# Patient Record
Sex: FEMALE | Race: OTHER | Employment: PART TIME | URBAN - METROPOLITAN AREA
[De-identification: names, ages, dates, MRNs, and addresses within clinical notes are randomized per-mention and may not be internally consistent; named-entity substitution may affect disease eponyms.]

---

## 2022-03-18 PROBLEM — D64.9 ANEMIA: Status: ACTIVE | Noted: 2018-07-01

## 2024-03-05 ENCOUNTER — APPOINTMENT (OUTPATIENT)
Dept: GENERAL RADIOLOGY | Age: 34
End: 2024-03-05

## 2024-03-05 ENCOUNTER — HOSPITAL ENCOUNTER (EMERGENCY)
Age: 34
Discharge: HOME | End: 2024-03-05

## 2024-03-05 VITALS
HEART RATE: 70 BPM | SYSTOLIC BLOOD PRESSURE: 110 MMHG | BODY MASS INDEX: 23.74 KG/M2 | TEMPERATURE: 98.6 F | HEIGHT: 63 IN | WEIGHT: 134 LBS | DIASTOLIC BLOOD PRESSURE: 67 MMHG | RESPIRATION RATE: 17 BRPM | OXYGEN SATURATION: 100 %

## 2024-03-05 DIAGNOSIS — S52.255A CLOSED NONDISPLACED COMMINUTED FRACTURE OF SHAFT OF LEFT ULNA, INITIAL ENCOUNTER: Primary | ICD-10-CM

## 2024-03-05 LAB — HCG UR QL: NEGATIVE

## 2024-03-05 PROCEDURE — 29125 APPL SHORT ARM SPLINT STATIC: CPT

## 2024-03-05 PROCEDURE — 73110 X-RAY EXAM OF WRIST: CPT

## 2024-03-05 PROCEDURE — 73090 X-RAY EXAM OF FOREARM: CPT

## 2024-03-05 PROCEDURE — 99283 EMERGENCY DEPT VISIT LOW MDM: CPT

## 2024-03-05 PROCEDURE — 6370000000 HC RX 637 (ALT 250 FOR IP)

## 2024-03-05 PROCEDURE — 81025 URINE PREGNANCY TEST: CPT

## 2024-03-05 RX ORDER — ONDANSETRON 4 MG/1
4 TABLET, ORALLY DISINTEGRATING ORAL 3 TIMES DAILY PRN
Qty: 21 TABLET | Refills: 0 | Status: SHIPPED | OUTPATIENT
Start: 2024-03-05

## 2024-03-05 RX ORDER — ACETAMINOPHEN 325 MG/1
650 TABLET ORAL
Status: COMPLETED | OUTPATIENT
Start: 2024-03-05 | End: 2024-03-05

## 2024-03-05 RX ORDER — HYDROCODONE BITARTRATE AND ACETAMINOPHEN 5; 325 MG/1; MG/1
1 TABLET ORAL EVERY 6 HOURS PRN
Qty: 10 TABLET | Refills: 0 | Status: SHIPPED | OUTPATIENT
Start: 2024-03-05 | End: 2024-03-08

## 2024-03-05 RX ORDER — IBUPROFEN 600 MG/1
600 TABLET ORAL
Status: COMPLETED | OUTPATIENT
Start: 2024-03-05 | End: 2024-03-05

## 2024-03-05 RX ADMIN — ACETAMINOPHEN 650 MG: 325 TABLET ORAL at 21:25

## 2024-03-05 RX ADMIN — IBUPROFEN 600 MG: 600 TABLET, FILM COATED ORAL at 21:25

## 2024-03-05 ASSESSMENT — PAIN DESCRIPTION - LOCATION
LOCATION: ARM
LOCATION: ARM

## 2024-03-05 ASSESSMENT — PAIN - FUNCTIONAL ASSESSMENT
PAIN_FUNCTIONAL_ASSESSMENT: 0-10
PAIN_FUNCTIONAL_ASSESSMENT: NONE - DENIES PAIN

## 2024-03-05 ASSESSMENT — LIFESTYLE VARIABLES
HOW MANY STANDARD DRINKS CONTAINING ALCOHOL DO YOU HAVE ON A TYPICAL DAY: PATIENT DOES NOT DRINK
HOW OFTEN DO YOU HAVE A DRINK CONTAINING ALCOHOL: NEVER

## 2024-03-05 ASSESSMENT — PAIN DESCRIPTION - ORIENTATION
ORIENTATION: LEFT
ORIENTATION: LEFT

## 2024-03-05 ASSESSMENT — PAIN SCALES - GENERAL
PAINLEVEL_OUTOF10: 9
PAINLEVEL_OUTOF10: 9

## 2024-03-06 NOTE — ED PROVIDER NOTES
lacerations, snuff box tenderness or crepitus. Normal pulse.      Left hand: Normal. No swelling, deformity, tenderness or bony tenderness. Normal range of motion. Normal strength. Normal sensation. Normal capillary refill. Normal pulse.        Arms:       Cervical back: Normal range of motion. No rigidity.   Lymphadenopathy:      Cervical: No cervical adenopathy.   Skin:     General: Skin is warm.      Findings: No rash.   Neurological:      Mental Status: She is alert and oriented to person, place, and time.      Sensory: No sensory deficit.      Motor: No weakness.   Psychiatric:         Behavior: Behavior normal.        Procedures     Procedures    Orders Placed This Encounter   Procedures    XR WRIST LEFT (MIN 3 VIEWS)    XR RADIUS ULNA LEFT (2 VIEWS)    [facility]    POC PREGNANCY UR-QUAL    POC Pregnancy Urine Qual    [facility], Left; M        Medications given during this emergency department visit:  Medications   acetaminophen (TYLENOL) tablet 650 mg (650 mg Oral Given 3/5/24 2125)   ibuprofen (ADVIL;MOTRIN) tablet 600 mg (600 mg Oral Given 3/5/24 2125)       Discharge Medication List as of 3/5/2024 10:45 PM        START taking these medications    Details   HYDROcodone-acetaminophen (NORCO) 5-325 MG per tablet Take 1 tablet by mouth every 6 hours as needed for Pain for up to 3 days. Intended supply: 3 days. Take lowest dose possible to manage pain Max Daily Amount: 4 tablets, Disp-10 tablet, R-0Print      ondansetron (ZOFRAN-ODT) 4 MG disintegrating tablet Take 1 tablet by mouth 3 times daily as needed for Nausea or Vomiting, Disp-21 tablet, R-0Print              No past medical history on file.     No past surgical history on file.     Social History     Socioeconomic History    Marital status:         Discharge Medication List as of 3/5/2024 10:45 PM           Results for orders placed or performed during the hospital encounter of

## 2024-03-06 NOTE — ED NOTES
I have reviewed discharge instructions with the patient.  The patient verbalized understanding.    Patient left ED via Discharge Method: ambulatory to Home with self.    Opportunity for questions and clarification provided.       Patient given 2 scripts.         To continue your aftercare when you leave the hospital, you may receive an automated call from our care team to check in on how you are doing.  This is a free service and part of our promise to provide the best care and service to meet your aftercare needs.” If you have questions, or wish to unsubscribe from this service please call [phone].  Thank you for Choosing our [facility] Emergency Department.         Dali Caceres LPN  03/05/24 [id number]

## 2024-03-06 NOTE — DISCHARGE INSTRUCTIONS
Mantenga mckeon cabestrillo seco, no lo moje. Manténgalo encendido continuamente. Por favor rosalia un seguimiento con el ortopedista, tracy números enumerados anteriormente. Puede feng analgésicos según sea necesario; también le he proporcionado medicamentos para las náuseas. Si comienza a experimentar un empeoramiento del dolor, hinchazón o decoloración en la mano, o cualquier síntoma nuevo o que empeore, regrese al departamento de emergencias.       Please keep your sling dry, do not get it wet.  Keep it on continuously.  Please follow-up with the orthopedics, their numbers listed above.  You can take the pain medicine as needed, I have also provided you with nausea medication.  If you do begin to experience any worsening pain, swelling or discoloration in your hand, or any new or worsening symptoms return to the emergency department.

## 2024-03-06 NOTE — ED TRIAGE NOTES
Pt ambulatory to ED with complaint of left wrist pain sp trip and fall pta. Swelling and deformity noted are.      [id number] used for triage.

## 2024-03-07 ENCOUNTER — OFFICE VISIT (OUTPATIENT)
Dept: ORTHOPEDIC SURGERY | Age: 34
End: 2024-03-07

## 2024-03-07 DIAGNOSIS — S52.202A CLOSED FRACTURE OF SHAFT OF LEFT ULNA, UNSPECIFIED FRACTURE MORPHOLOGY, INITIAL ENCOUNTER: Primary | ICD-10-CM

## 2024-03-07 PROCEDURE — M5030 MISC ARM SLING: HCPCS | Performed by: NURSE PRACTITIONER

## 2024-03-07 PROCEDURE — 99204 OFFICE O/P NEW MOD 45 MIN: CPT | Performed by: NURSE PRACTITIONER

## 2024-03-07 RX ORDER — HYDROCODONE BITARTRATE AND ACETAMINOPHEN 5; 325 MG/1; MG/1
1 TABLET ORAL EVERY 6 HOURS PRN
Qty: 20 TABLET | Refills: 0 | Status: SHIPPED | OUTPATIENT
Start: 2024-03-07 | End: 2024-03-12

## 2024-03-07 NOTE — PROGRESS NOTES
Patient was fitted and instructed on the use of Arm Sling for the left shoulder. Patient is aware the arm should fit comfortably in the sling with the elbow as far back as possible. I explained the thumb should be placed in the thumb loop to help prevent the arm from sliding out of the sling. Patient was instructed that the shoulder strap should cross over the opposite shoulder continuing threw the loop attached to the sling and then velcro back on the shoulder strap.     Patient read and signed documenting they understand and agree to [facility]'s current DME return policy.

## 2024-03-07 NOTE — PROGRESS NOTES
Orthopaedic Hand Clinic Note    Name: Giselle Redmond  Age: 34 y.o.  YOB: 1990  Gender: female  MRN: [id number]      CC: Patient referred for evaluation of left forearm pain    HPI: Giselle Redmond is a 34 y.o. female right handed with a chief complaint of   forearm  pain. The injury occurred 2 days ago when the patient fell while going up the stairs at home. The pain is located diffusely about the left forearm. Denies numbness or paresthesias of the fingers. Evaluation at [facility] emergency room has included x-rays. Treatment to date has included sugar-tong splint and sling. Denies loss of consciousness, head injury or neck pain.  She works in cleaning services.  She is taking Norco for pain.      ROS/Meds/PSH/PMH/FH/SH: I personally reviewed the patients standard intake form.  Pertinents are discussed in the HPI    Physical Examination:  General: Awake and alert.  HEENT: Normocephalic, atraumatic  CV/Pulm: Breathing even and unlabored  Skin: No obvious rashes noted.  Lymphatic: No obvious evidence of lymphedema or lymphadenopathy    Musculoskeletal Exam:  Examination of the left upper extremity demonstrates no open wounds. Negative Tinel's over left carpal tunnel. Sensation is intact throughout, cap refill in all fingers < 5 seconds. Finger motion is limited with  moderate  swelling of the hand and fingers. Tenderness over the ulnar shaft. . No tenderness at elbow, shoulder, clavicle or cervical spine.    Imaging / Electrodiagnostic Tests:     XR of the left wrist, 3 views, are independently reviewed and interpreted.  Patient has a nondisplaced ulnar shaft fracture    Assessment:   1. Closed fracture of shaft of left ulna, unspecified fracture morphology, initial encounter        Plan:   We discussed the diagnosis and different treatment options. We discussed observation, casting, further imaging, and surgical fixation and the risks, benefits and alternatives of all

## 2024-03-14 ENCOUNTER — OFFICE VISIT (OUTPATIENT)
Dept: ORTHOPEDIC SURGERY | Age: 34
End: 2024-03-14

## 2024-03-14 DIAGNOSIS — S52.202A CLOSED FRACTURE OF SHAFT OF LEFT ULNA, UNSPECIFIED FRACTURE MORPHOLOGY, INITIAL ENCOUNTER: Primary | ICD-10-CM

## 2024-03-14 RX ORDER — HYDROCODONE BITARTRATE AND ACETAMINOPHEN 5; 325 MG/1; MG/1
1 TABLET ORAL EVERY 6 HOURS PRN
Qty: 20 TABLET | Refills: 0 | Status: SHIPPED | OUTPATIENT
Start: 2024-03-14 | End: 2024-03-19

## 2024-03-14 NOTE — PROGRESS NOTES
Patient was fit for a Wrist/Forearm lacer for patients left wrist/ elbow pain. Patient is instructed the brace should fit nicely with in the palm and right below the knuckles on the dorsal side of the hand. The patient was aware the brace should fit snuggly around the wrist/forearm area. The strap placed through the thumb and first finger should fit comfortably to insure the brace does not slide or shift.     Patient read and signed documenting they understand and agree to [facility]'s current DME return policy.   
something for pain.  Will give her new work note.  I will reassess her progress back in 2 weeks with x-rays..     Patient voiced accordance and understanding of the information provided and the formulated plan. All questions were answered to the patient's satisfaction during the encounter.    4 This is an acute complicated injury  Treatment at this time: Prescription medication, Brace, and work note and activity restrictions    NINA Mckeon - CNP  Orthopaedic Surgery  03/14/24  11:48 AM

## 2024-03-29 ENCOUNTER — OFFICE VISIT (OUTPATIENT)
Dept: ORTHOPEDIC SURGERY | Age: 34
End: 2024-03-29

## 2024-03-29 DIAGNOSIS — S52.202A CLOSED FRACTURE OF SHAFT OF LEFT ULNA, UNSPECIFIED FRACTURE MORPHOLOGY, INITIAL ENCOUNTER: Primary | ICD-10-CM

## 2024-03-29 NOTE — PROGRESS NOTES
Orthopaedic Hand Clinic Note    Name: Giselle Redmond  Age: 34 y.o.  YOB: 1990  Gender: female  MRN: [id number]      Follow up visit:   1. Closed fracture of shaft of left ulna, unspecified fracture morphology, initial encounter        HPI: Giselle Redmond is a 34 y.o. female who is following up for left ulnar shaft fracture 3 weeks ago, patient reports improvement in swelling and pain.      ROS/Meds/PSH/PMH/FH/SH: I personally reviewed the patients standard intake form.  Pertinents are discussed in the HPI    Physical Examination:  General: Awake and alert.  HEENT: Normocephalic, atraumatic  CV/Pulm: Breathing even and unlabored  Skin: No obvious rashes noted.  Lymphatic: No obvious evidence of lymphedema or lymphadenopathy    Musculoskeletal Examination:  Examination on the left upper extremity demonstrates cap refill < 5 seconds in all fingers, mild to moderate swelling of the wrist, tenderness palpation of the fracture site, she has full finger motion although with some discomfort, she has limited wrist flexion extension, pronation and supination.    Imaging / Electrodiagnostic Tests:     left Wrist XR: AP, Lateral, Oblique views     Clinical Indication:  1. Closed fracture of shaft of left ulna, unspecified fracture morphology, initial encounter           Report: AP, lateral, oblique x-ray wrist XRs demonstrates distal ulna shaft fracture, comminuted, unchanged in alignment from previous x-rays, no callus is noted    Impression: as above     Lito Gutiérrez MD         Assessment:   1. Closed fracture of shaft of left ulna, unspecified fracture morphology, initial encounter        Plan:   We discussed the diagnosis and different treatment options. We discussed observation, therapy, antiinflammatory medications and other pertinent treatment modalities.    After discussing in detail the patient elects to proceed with wrist brace, home exercise program was discussed

## 2024-05-03 ENCOUNTER — OFFICE VISIT (OUTPATIENT)
Age: 34
End: 2024-05-03

## 2024-05-03 DIAGNOSIS — S52.202A CLOSED FRACTURE OF SHAFT OF LEFT ULNA, UNSPECIFIED FRACTURE MORPHOLOGY, INITIAL ENCOUNTER: Primary | ICD-10-CM

## 2024-05-03 DIAGNOSIS — S69.82XA TFCC (TRIANGULAR FIBROCARTILAGE COMPLEX) INJURY, LEFT, INITIAL ENCOUNTER: ICD-10-CM

## 2024-05-03 DIAGNOSIS — M65.4 DE QUERVAIN'S TENOSYNOVITIS, LEFT: ICD-10-CM

## 2024-05-03 RX ORDER — METHYLPREDNISOLONE 4 MG/1
TABLET ORAL
Qty: 1 KIT | Refills: 0 | Status: SHIPPED | OUTPATIENT
Start: 2024-05-03

## 2024-05-03 RX ORDER — MELOXICAM 15 MG/1
15 TABLET ORAL DAILY
Qty: 28 TABLET | Refills: 0 | Status: SHIPPED | OUTPATIENT
Start: 2024-05-03 | End: 2024-05-31

## 2024-05-03 NOTE — PROGRESS NOTES
diagnosis and different treatment options. We discussed observation, therapy, antiinflammatory medications and other pertinent treatment modalities.    After discussing in detail the patient elects to proceed with Medrol Dosepak followed by Mobic 15 daily for 4 weeks, I do believe she has some degree of de Quervain's tendinitis as well as some degree of sprain at the TFCC joint, she will return in 2 months if this is not improved.     Patient voiced accordance and understanding of the information provided and the formulated plan. All questions were answered to the patient's satisfaction during the encounter.    Lito Gutiérrez MD  Orthopaedic Surgery  05/03/24  11:48 AM